# Patient Record
Sex: MALE | Race: BLACK OR AFRICAN AMERICAN | NOT HISPANIC OR LATINO | Employment: OTHER | URBAN - METROPOLITAN AREA
[De-identification: names, ages, dates, MRNs, and addresses within clinical notes are randomized per-mention and may not be internally consistent; named-entity substitution may affect disease eponyms.]

---

## 2021-03-18 ENCOUNTER — IMMUNIZATION (OUTPATIENT)
Dept: INTERNAL MEDICINE | Facility: CLINIC | Age: 66
End: 2021-03-18
Payer: MEDICARE

## 2021-03-18 DIAGNOSIS — Z23 NEED FOR VACCINATION: Primary | ICD-10-CM

## 2021-03-18 PROCEDURE — 91300 COVID-19, MRNA, LNP-S, PF, 30 MCG/0.3 ML DOSE VACCINE: CPT | Mod: PBBFAC | Performed by: INTERNAL MEDICINE

## 2021-04-08 ENCOUNTER — IMMUNIZATION (OUTPATIENT)
Dept: INTERNAL MEDICINE | Facility: CLINIC | Age: 66
End: 2021-04-08
Payer: MEDICARE

## 2021-04-08 DIAGNOSIS — Z23 NEED FOR VACCINATION: Primary | ICD-10-CM

## 2021-04-08 PROCEDURE — 0002A COVID-19, MRNA, LNP-S, PF, 30 MCG/0.3 ML DOSE VACCINE: CPT | Mod: CV19,S$GLB,, | Performed by: INTERNAL MEDICINE

## 2021-04-08 PROCEDURE — 91300 COVID-19, MRNA, LNP-S, PF, 30 MCG/0.3 ML DOSE VACCINE: CPT | Mod: S$GLB,,, | Performed by: INTERNAL MEDICINE

## 2021-04-08 PROCEDURE — 91300 COVID-19, MRNA, LNP-S, PF, 30 MCG/0.3 ML DOSE VACCINE: ICD-10-PCS | Mod: S$GLB,,, | Performed by: INTERNAL MEDICINE

## 2021-04-08 PROCEDURE — 0002A COVID-19, MRNA, LNP-S, PF, 30 MCG/0.3 ML DOSE VACCINE: ICD-10-PCS | Mod: CV19,S$GLB,, | Performed by: INTERNAL MEDICINE

## 2021-12-29 ENCOUNTER — HOSPITAL ENCOUNTER (EMERGENCY)
Facility: OTHER | Age: 66
Discharge: HOME | End: 2021-12-29
Attending: EMERGENCY MEDICINE
Payer: MEDICARE

## 2021-12-29 VITALS
SYSTOLIC BLOOD PRESSURE: 138 MMHG | BODY MASS INDEX: 21.69 KG/M2 | HEART RATE: 92 BPM | DIASTOLIC BLOOD PRESSURE: 65 MMHG | HEIGHT: 66 IN | RESPIRATION RATE: 20 BRPM | WEIGHT: 135 LBS | OXYGEN SATURATION: 100 % | TEMPERATURE: 98 F

## 2021-12-29 DIAGNOSIS — U07.1 COVID-19 VIRUS DETECTED: ICD-10-CM

## 2021-12-29 DIAGNOSIS — U07.1 COVID-19 VIRUS INFECTION: Primary | ICD-10-CM

## 2021-12-29 LAB
CTP QC/QA: YES
SARS-COV-2 RDRP RESP QL NAA+PROBE: POSITIVE

## 2021-12-29 PROCEDURE — U0002 COVID-19 LAB TEST NON-CDC: HCPCS | Performed by: EMERGENCY MEDICINE

## 2021-12-29 PROCEDURE — 99282 EMERGENCY DEPT VISIT SF MDM: CPT | Mod: 25

## 2021-12-29 NOTE — ED PROVIDER NOTES
"     Source of History:  Patient    Chief complaint:  COVID-19 Concerns (Pt states he has had direct contact with grandson who is positive.)      HPI:  Malcolm Turner Jr. is a 66 y.o. male presenting with request for COVID test.  He does not report any symptoms.  He believes insult to be positive as he had direct exposure.  He does report he is vaccinated for COVID    This is the extent to the patients complaints today here in the emergency department.    ROS: As per HPI and below:  Constitutional: No fever.  No chills.  Eyes: No visual changes.   ENT: No sore throat. No ear pain.  Urinary: No abnormal urination.  MSK:  No arthralgias  Integument: No rashes or lesions.    Review of patient's allergies indicates:  No Known Allergies    PMH:  As per HPI and below:  No past medical history on file.  No past surgical history on file.         Physical Exam:    /65 (BP Location: Right arm, Patient Position: Sitting)   Pulse 92   Temp 98.2 °F (36.8 °C) (Oral)   Resp 20   Ht 5' 6" (1.676 m)   Wt 61.2 kg (135 lb)   SpO2 100%   BMI 21.79 kg/m²   Nursing note and vital signs reviewed.  Constitutional: No acute distress.  Eyes: No conjunctival injection.  Extraocular muscles are intact.  ENT: Normal phonation.  Musculoskeletal:  Ambulating with smooth steady gait.  Full range of motion  Skin: No rashes seen.  Good turgor.  No abrasions.  No ecchymoses.  Psych: Appropriate, conversant.        I decided to obtain the patient's medical records.      MDM:    66 y.o. male Patient seen for a COVID exposure, therefore due to the most recent recommendations from our hospital administrations/infectious disease at this time, the patient will be swabbed for COVID 19. Rapid COVID is positive.  I was notified by nurse that patient was getting and patient wanted to leave.  I did go evaluate the patient.  Discussed we typically ambulate.  He declined.  He is in stable condition.  He inquired about medications.  He was informed " of over-the-counter medications to take for symptoms.  He reported that he would like to leave without instructions.  He was instructed that he could leave as he did not want to await any additional workup and/or exam.  He was stable at time of leaving and left without discharge paperwork                 Diagnostic Impression:    1. COVID-19 virus infection         ED Disposition Condition    Discharge Stable          ED Prescriptions     None        Follow-up Information    None          MIKAL Mace  12/29/21 [id number]

## 2023-06-28 DIAGNOSIS — M79.605 LEFT LEG PAIN: Primary | ICD-10-CM
